# Patient Record
Sex: FEMALE | Race: WHITE
[De-identification: names, ages, dates, MRNs, and addresses within clinical notes are randomized per-mention and may not be internally consistent; named-entity substitution may affect disease eponyms.]

---

## 2022-01-24 ENCOUNTER — HOSPITAL ENCOUNTER (INPATIENT)
Dept: HOSPITAL 96 - M.ERS | Age: 72
LOS: 2 days | Discharge: HOME HEALTH | DRG: 177 | End: 2022-01-26
Attending: INTERNAL MEDICINE | Admitting: INTERNAL MEDICINE
Payer: MEDICARE

## 2022-01-24 VITALS — SYSTOLIC BLOOD PRESSURE: 165 MMHG | DIASTOLIC BLOOD PRESSURE: 60 MMHG

## 2022-01-24 VITALS — HEIGHT: 60.98 IN | BODY MASS INDEX: 23.6 KG/M2 | WEIGHT: 125 LBS

## 2022-01-24 VITALS — SYSTOLIC BLOOD PRESSURE: 166 MMHG | DIASTOLIC BLOOD PRESSURE: 66 MMHG

## 2022-01-24 VITALS — SYSTOLIC BLOOD PRESSURE: 116 MMHG | DIASTOLIC BLOOD PRESSURE: 57 MMHG

## 2022-01-24 VITALS — SYSTOLIC BLOOD PRESSURE: 1314 MMHG | DIASTOLIC BLOOD PRESSURE: 65 MMHG

## 2022-01-24 DIAGNOSIS — Z88.8: ICD-10-CM

## 2022-01-24 DIAGNOSIS — J96.01: ICD-10-CM

## 2022-01-24 DIAGNOSIS — Z88.6: ICD-10-CM

## 2022-01-24 DIAGNOSIS — J12.82: ICD-10-CM

## 2022-01-24 DIAGNOSIS — U07.1: Primary | ICD-10-CM

## 2022-01-24 LAB
ABSOLUTE BASOPHILS: 0 THOU/UL (ref 0–0.2)
ABSOLUTE EOSINOPHILS: 0 THOU/UL (ref 0–0.7)
ABSOLUTE MONOCYTES: 0.4 THOU/UL (ref 0–1.2)
ALBUMIN SERPL-MCNC: 3.4 G/DL (ref 3.4–5)
ALP SERPL-CCNC: 73 U/L (ref 46–116)
ALT SERPL-CCNC: 20 U/L (ref 30–65)
ANION GAP SERPL CALC-SCNC: 12 MMOL/L (ref 7–16)
AST SERPL-CCNC: 33 U/L (ref 15–37)
BASOPHILS NFR BLD AUTO: 0.4 %
BE: -3.4 MMOL/L
BILIRUB SERPL-MCNC: 0.4 MG/DL
BUN SERPL-MCNC: 14 MG/DL (ref 7–18)
CALCIUM SERPL-MCNC: 8.2 MG/DL (ref 8.5–10.1)
CHLORIDE SERPL-SCNC: 99 MMOL/L (ref 98–107)
CO2 SERPL-SCNC: 23 MMOL/L (ref 21–32)
CREAT SERPL-MCNC: 0.7 MG/DL (ref 0.6–1.3)
EOSINOPHIL NFR BLD: 0.1 %
GAS PNL BLDV: 50.8 MMHG (ref 35–45)
GLUCOSE SERPL-MCNC: 102 MG/DL (ref 70–99)
GRANULOCYTES NFR BLD MANUAL: 69.9 %
HCT VFR BLD CALC: 36.8 % (ref 37–47)
HGB BLD-MCNC: 12.4 GM/DL (ref 12–15)
LYMPHOCYTES # BLD: 0.7 THOU/UL (ref 0.8–5.3)
LYMPHOCYTES NFR BLD AUTO: 18.8 %
MCH RBC QN AUTO: 29.5 PG (ref 26–34)
MCHC RBC AUTO-ENTMCNC: 33.8 G/DL (ref 28–37)
MCV RBC: 87.5 FL (ref 80–100)
MONOCYTES NFR BLD: 10.8 %
MPV: 7 FL. (ref 7.2–11.1)
NEUTROPHILS # BLD: 2.5 THOU/UL (ref 1.6–8.1)
NT-PRO BRAIN NAT PEPTIDE: 74 PG/ML (ref ?–300)
NUCLEATED RBCS: 0 /100WBC
PCO2 BLDV: 35.6 MMHG (ref 41–51)
PLATELET COUNT*: 181 THOU/UL (ref 150–400)
POTASSIUM SERPL-SCNC: 4 MMOL/L (ref 3.5–5.1)
PROT SERPL-MCNC: 7.4 G/DL (ref 6.4–8.2)
RBC # BLD AUTO: 4.2 MIL/UL (ref 4.2–5)
RDW-CV: 13.1 % (ref 10.5–14.5)
SODIUM SERPL-SCNC: 134 MMOL/L (ref 136–145)
WBC # BLD AUTO: 3.5 THOU/UL (ref 4–11)

## 2022-01-25 VITALS — DIASTOLIC BLOOD PRESSURE: 45 MMHG | SYSTOLIC BLOOD PRESSURE: 110 MMHG

## 2022-01-25 VITALS — DIASTOLIC BLOOD PRESSURE: 60 MMHG | SYSTOLIC BLOOD PRESSURE: 122 MMHG

## 2022-01-25 VITALS — SYSTOLIC BLOOD PRESSURE: 100 MMHG | DIASTOLIC BLOOD PRESSURE: 54 MMHG

## 2022-01-25 VITALS — SYSTOLIC BLOOD PRESSURE: 103 MMHG | DIASTOLIC BLOOD PRESSURE: 58 MMHG

## 2022-01-25 VITALS — SYSTOLIC BLOOD PRESSURE: 112 MMHG | DIASTOLIC BLOOD PRESSURE: 47 MMHG

## 2022-01-25 LAB
ABSOLUTE BASOPHILS: 0 THOU/UL (ref 0–0.2)
ABSOLUTE EOSINOPHILS: 0 THOU/UL (ref 0–0.7)
ABSOLUTE MONOCYTES: 0.5 THOU/UL (ref 0–1.2)
ALBUMIN SERPL-MCNC: 3 G/DL (ref 3.4–5)
ALP SERPL-CCNC: 75 U/L (ref 46–116)
ALT SERPL-CCNC: 16 U/L (ref 30–65)
ANION GAP SERPL CALC-SCNC: 9 MMOL/L (ref 7–16)
APTT BLD: 29.1 SECONDS (ref 25–31.3)
AST SERPL-CCNC: 25 U/L (ref 15–37)
BASOPHILS NFR BLD AUTO: 0.1 %
BILIRUB SERPL-MCNC: 0.3 MG/DL
BUN SERPL-MCNC: 14 MG/DL (ref 7–18)
CALCIUM SERPL-MCNC: 8.2 MG/DL (ref 8.5–10.1)
CHLORIDE SERPL-SCNC: 104 MMOL/L (ref 98–107)
CO2 SERPL-SCNC: 24 MMOL/L (ref 21–32)
CREAT SERPL-MCNC: 0.8 MG/DL (ref 0.6–1.3)
EOSINOPHIL NFR BLD: 0 %
GLUCOSE SERPL-MCNC: 164 MG/DL (ref 70–99)
GRANULOCYTES NFR BLD MANUAL: 75.7 %
HCT VFR BLD CALC: 36.1 % (ref 37–47)
HGB BLD-MCNC: 12.4 GM/DL (ref 12–15)
INR PPP: 1
LYMPHOCYTES # BLD: 0.9 THOU/UL (ref 0.8–5.3)
LYMPHOCYTES NFR BLD AUTO: 15.8 %
MAGNESIUM SERPL-MCNC: 2.1 MG/DL (ref 1.8–2.4)
MCH RBC QN AUTO: 29.7 PG (ref 26–34)
MCHC RBC AUTO-ENTMCNC: 34.3 G/DL (ref 28–37)
MCV RBC: 86.5 FL (ref 80–100)
MONOCYTES NFR BLD: 8.4 %
MPV: 7.1 FL. (ref 7.2–11.1)
NEUTROPHILS # BLD: 4.2 THOU/UL (ref 1.6–8.1)
NUCLEATED RBCS: 0 /100WBC
PHOSPHATE SERPL-MCNC: 2.1 MG/DL (ref 2.5–4.9)
PLATELET COUNT*: 208 THOU/UL (ref 150–400)
POTASSIUM SERPL-SCNC: 3.8 MMOL/L (ref 3.5–5.1)
PROT SERPL-MCNC: 7 G/DL (ref 6.4–8.2)
PROTHROMBIN TIME: 10.2 SECONDS (ref 9.2–11.5)
RBC # BLD AUTO: 4.17 MIL/UL (ref 4.2–5)
RDW-CV: 13.1 % (ref 10.5–14.5)
SODIUM SERPL-SCNC: 137 MMOL/L (ref 136–145)
WBC # BLD AUTO: 5.6 THOU/UL (ref 4–11)

## 2022-01-25 NOTE — EKG
[place] [address]
Phone:  [phone]                     ELECTROCARDIOGRAM REPORT      
_______________________________________________________________________________
 
Name:         KERMIT SABA              Room:          [address]    ADM IN 
M.R.#:    [id number]     Account #:     [id number]  
Admission:    22    Attend Phys:   Rip Leo
Discharge:                Date of Birth: [birth year]  
Date of Service: 22  Report #:      [id number]
        [id number]
_______________________________________________________________________________
THIS REPORT FOR:  //name//                      
 
                         [facility] ED
                                       
Test Date:    2022               Test Time:    18:02:30
Pat Name:     KERMIT SABA             Department:   
Patient ID:   [id number]            Room:         [facility]
Gender:       F                        Technician:   
:          1950               Requested By: Lexie Mcnally
Order Number: [id number] MD:   Tyler Funk
                                 Measurements
Intervals                              Axis          
Rate:         80                       P:            36
WY:           125                      QRS:          74
QRSD:         84                       T:            -1
QT:           413                                    
QTc:          477                                    
                           Interpretive Statements
Sinus rhythm
Left ventricular hypertrophy, by voltage
Borderline T abnormalities, diffuse leads
Borderline prolonged QT interval
Compared to ECG 10/16/2014 14:41:55
Left ventricular hypertrophy now present
T-wave abnormality now present
Electronically Signed On 2022 9:06:07 CST by Tyler Funk
[email or web address]
 
 
 
 
 
 
 
 
 
 
 
 
 
 
 
 
 
  <ELECTRONICALLY SIGNED>
                                           By: Tyler Funk MD, FACC   
  22     0906
D: 22   _____________________________________
T: 22   Tyler Funk MD, FAC     /EPI

## 2022-01-26 VITALS — SYSTOLIC BLOOD PRESSURE: 106 MMHG | DIASTOLIC BLOOD PRESSURE: 59 MMHG

## 2022-01-26 VITALS — DIASTOLIC BLOOD PRESSURE: 59 MMHG | SYSTOLIC BLOOD PRESSURE: 106 MMHG

## 2022-01-26 VITALS — SYSTOLIC BLOOD PRESSURE: 98 MMHG | DIASTOLIC BLOOD PRESSURE: 40 MMHG

## 2022-01-26 PROCEDURE — XW033E5 INTRODUCTION OF REMDESIVIR ANTI-INFECTIVE INTO PERIPHERAL VEIN, PERCUTANEOUS APPROACH, NEW TECHNOLOGY GROUP 5: ICD-10-PCS | Performed by: INTERNAL MEDICINE
